# Patient Record
(demographics unavailable — no encounter records)

---

## 2025-06-12 NOTE — REASON FOR VISIT
[Symptom and Test Evaluation] : symptom and test evaluation [CV Risk Factors and Non-Cardiac Disease] : CV risk factors and non-cardiac disease [Hypertension] : hypertension [Hyperlipidemia] : hyperlipidemia [FreeTextEntry1] : I had the pleasure of evaluating Mr. Romain Mclaughlin for cardiovascular consultation. I last saw him in October 2024.  He is a 43 yo man with asthma, on albuterol; no h/o hospitalizations, steroids No h/o HTN, HLD, DM2 No FH of early CAD or SCD He no longer complains of chest discomfort.   He had previously presented to [facility] when he noted BPs 140/100s mmHg at home in setting of increased anxiety/stress. No intervention was performed in the ED.  His BPs normalized on its own.  No change in medical therapy recommended at that time. Prior cardiac testing included an unremarkable TTE and stress test.  Earlier this week, he noted having AMS/forgetfulness associated with accelerated HTN with SBPs as high as 180s mmHg. He notes that his BPs are now around 140-150s mmHg. Reports increased stress/anxiety as well as insomnia.  In the office today, BPs moderately elevated 144/97 mmHg HR 84 bpm EKG NSR Unremarkable exam Appears euvolemic  Plan: Will arrange for CT head without contrast and echocardiogram. Will check labs Will start losartan 25 mg qd and asked patient to monitor BPs. Will also arrange for Calcium score CT scan. F/U one month.